# Patient Record
Sex: FEMALE | Race: BLACK OR AFRICAN AMERICAN | Employment: OTHER | URBAN - METROPOLITAN AREA
[De-identification: names, ages, dates, MRNs, and addresses within clinical notes are randomized per-mention and may not be internally consistent; named-entity substitution may affect disease eponyms.]

---

## 2019-10-13 ENCOUNTER — APPOINTMENT (OUTPATIENT)
Dept: CT IMAGING | Facility: HOSPITAL | Age: 64
End: 2019-10-13
Attending: EMERGENCY MEDICINE
Payer: MEDICARE

## 2019-10-13 ENCOUNTER — HOSPITAL ENCOUNTER (EMERGENCY)
Facility: HOSPITAL | Age: 64
Discharge: HOME | End: 2019-10-13
Attending: EMERGENCY MEDICINE
Payer: MEDICARE

## 2019-10-13 VITALS
WEIGHT: 200 LBS | BODY MASS INDEX: 34.15 KG/M2 | OXYGEN SATURATION: 98 % | HEIGHT: 64 IN | HEART RATE: 69 BPM | SYSTOLIC BLOOD PRESSURE: 139 MMHG | TEMPERATURE: 98 F | RESPIRATION RATE: 18 BRPM | DIASTOLIC BLOOD PRESSURE: 90 MMHG

## 2019-10-13 DIAGNOSIS — T14.8XXA MUSCULOSKELETAL STRAIN: Primary | ICD-10-CM

## 2019-10-13 DIAGNOSIS — K59.00 CONSTIPATION, UNSPECIFIED CONSTIPATION TYPE: ICD-10-CM

## 2019-10-13 DIAGNOSIS — N83.8 OVARIAN MASS: ICD-10-CM

## 2019-10-13 PROCEDURE — 74176 CT ABD & PELVIS W/O CONTRAST: CPT | Performed by: EMERGENCY MEDICINE

## 2019-10-13 PROCEDURE — 80048 BASIC METABOLIC PNL TOTAL CA: CPT | Performed by: EMERGENCY MEDICINE

## 2019-10-13 PROCEDURE — 96374 THER/PROPH/DIAG INJ IV PUSH: CPT

## 2019-10-13 PROCEDURE — 81003 URINALYSIS AUTO W/O SCOPE: CPT | Performed by: EMERGENCY MEDICINE

## 2019-10-13 PROCEDURE — 83690 ASSAY OF LIPASE: CPT | Performed by: EMERGENCY MEDICINE

## 2019-10-13 PROCEDURE — 85025 COMPLETE CBC W/AUTO DIFF WBC: CPT | Performed by: EMERGENCY MEDICINE

## 2019-10-13 PROCEDURE — 99284 EMERGENCY DEPT VISIT MOD MDM: CPT

## 2019-10-13 PROCEDURE — 80076 HEPATIC FUNCTION PANEL: CPT | Performed by: EMERGENCY MEDICINE

## 2019-10-13 RX ORDER — KETOROLAC TROMETHAMINE 30 MG/ML
15 INJECTION, SOLUTION INTRAMUSCULAR; INTRAVENOUS ONCE
Status: COMPLETED | OUTPATIENT
Start: 2019-10-13 | End: 2019-10-13

## 2019-10-13 RX ORDER — KETOROLAC TROMETHAMINE 30 MG/ML
30 INJECTION, SOLUTION INTRAMUSCULAR; INTRAVENOUS ONCE
Status: DISCONTINUED | OUTPATIENT
Start: 2019-10-13 | End: 2019-10-13

## 2019-10-13 RX ORDER — NAPROXEN 500 MG/1
500 TABLET ORAL 2 TIMES DAILY PRN
Qty: 20 TABLET | Refills: 0 | Status: SHIPPED | OUTPATIENT
Start: 2019-10-13 | End: 2019-10-20

## 2019-10-13 NOTE — ED PROVIDER NOTES
Patient Seen in: [facility] AND [facility] Emergency Department      History   Patient presents with:  Abdomen/Flank Pain (GI/)    Stated Complaint: pain in right side x1 week    HPI    60-year-old female presents for evaluation of right flank pain.   Patient r sounds: Normal heart sounds. Pulmonary:      Effort: Pulmonary effort is normal. No respiratory distress. Breath sounds: Normal breath sounds. Abdominal:      General: Bowel sounds are normal. There is no distension.       Palpations: Abdomen is so kV was done based on the     patient's size. Use of iterative     reconstruction technique for dose reduction was used.   Dose information is     transmitted to the ACR ([employer]) [address] ([address]) which includ cystic right ovarian mass. A 1.7 cm cyst in     left ovary. Follow-up nonemergent ultrasound recommended for further     evaluation. 3. Small hiatal hernia. 4. Large amount of stool in the colon. 5. Fat containing umbilical hernia.     6. Atheros the patient was found to have Musculoskeletal strain  (primary encounter diagnosis)  Constipation, unspecified constipation type  Ovarian mass    Plan: Well-appearing patient with unremarkable emergency department evaluation.   Discussed CT findings of ovar

## 2019-10-13 NOTE — ED NOTES
Pt to ED c/o right flank pain radiating to RUQ x 1 week. Pt states pain is worse when she is laying down. Denies N/V/D. Denies fever/chills. Pt reports that she has been urinating more frequently in the past week.

## 2019-11-04 ENCOUNTER — APPOINTMENT (OUTPATIENT)
Dept: ULTRASOUND IMAGING | Facility: HOSPITAL | Age: 64
End: 2019-11-04
Attending: EMERGENCY MEDICINE
Payer: MEDICARE

## 2019-11-04 ENCOUNTER — HOSPITAL ENCOUNTER (EMERGENCY)
Facility: HOSPITAL | Age: 64
Discharge: HOME | End: 2019-11-04
Attending: EMERGENCY MEDICINE
Payer: MEDICARE

## 2019-11-04 VITALS
SYSTOLIC BLOOD PRESSURE: 136 MMHG | BODY MASS INDEX: 34.15 KG/M2 | DIASTOLIC BLOOD PRESSURE: 82 MMHG | TEMPERATURE: 98 F | RESPIRATION RATE: 17 BRPM | HEART RATE: 87 BPM | HEIGHT: 64 IN | OXYGEN SATURATION: 98 % | WEIGHT: 200 LBS

## 2019-11-04 DIAGNOSIS — R10.9 ABDOMINAL PAIN, RIGHT LATERAL: ICD-10-CM

## 2019-11-04 DIAGNOSIS — N83.8 OVARIAN MASS, RIGHT: Primary | ICD-10-CM

## 2019-11-04 PROCEDURE — 85025 COMPLETE CBC W/AUTO DIFF WBC: CPT | Performed by: EMERGENCY MEDICINE

## 2019-11-04 PROCEDURE — 99284 EMERGENCY DEPT VISIT MOD MDM: CPT

## 2019-11-04 PROCEDURE — 96374 THER/PROPH/DIAG INJ IV PUSH: CPT

## 2019-11-04 PROCEDURE — 76830 TRANSVAGINAL US NON-OB: CPT | Performed by: EMERGENCY MEDICINE

## 2019-11-04 PROCEDURE — 93975 VASCULAR STUDY: CPT | Performed by: EMERGENCY MEDICINE

## 2019-11-04 PROCEDURE — 80048 BASIC METABOLIC PNL TOTAL CA: CPT | Performed by: EMERGENCY MEDICINE

## 2019-11-04 PROCEDURE — 76856 US EXAM PELVIC COMPLETE: CPT | Performed by: EMERGENCY MEDICINE

## 2019-11-04 PROCEDURE — 81003 URINALYSIS AUTO W/O SCOPE: CPT | Performed by: EMERGENCY MEDICINE

## 2019-11-04 RX ORDER — HYDROCODONE BITARTRATE AND ACETAMINOPHEN 5; 325 MG/1; MG/1
1 TABLET ORAL EVERY 6 HOURS PRN
Qty: 10 TABLET | Refills: 0 | Status: SHIPPED | OUTPATIENT
Start: 2019-11-04

## 2019-11-04 RX ORDER — DOCUSATE SODIUM 100 MG/1
100 CAPSULE, LIQUID FILLED ORAL 2 TIMES DAILY
Qty: 14 CAPSULE | Refills: 0 | Status: SHIPPED | OUTPATIENT
Start: 2019-11-04 | End: 2019-11-11

## 2019-11-04 RX ORDER — MORPHINE SULFATE 4 MG/ML
4 INJECTION, SOLUTION INTRAMUSCULAR; INTRAVENOUS ONCE
Status: COMPLETED | OUTPATIENT
Start: 2019-11-04 | End: 2019-11-04

## 2019-11-04 RX ORDER — HYDROCODONE BITARTRATE AND ACETAMINOPHEN 5; 325 MG/1; MG/1
1 TABLET ORAL EVERY 6 HOURS PRN
Qty: 10 TABLET | Refills: 0 | Status: SHIPPED | OUTPATIENT
Start: 2019-11-04 | End: 2019-11-04

## 2019-11-04 RX ORDER — DOCUSATE SODIUM 100 MG/1
100 CAPSULE, LIQUID FILLED ORAL 2 TIMES DAILY
Qty: 14 CAPSULE | Refills: 0 | Status: SHIPPED | OUTPATIENT
Start: 2019-11-04 | End: 2019-11-07

## 2019-11-04 NOTE — ED NOTES
Pt seen at another hospital and here for the same pain. Has had several imaging done. No source of pain has been found.

## 2019-11-04 NOTE — CM/SW NOTE
Dr. Landy Rausch on call office called to schedule follow up appointment for patient per Dr. Satinder Andersen request.

## 2019-11-04 NOTE — ED PROVIDER NOTES
Patient Seen in: [facility] AND [facility] Emergency Department      History   Patient presents with:  Abdomen/Flank Pain (GI/)    Stated Complaint: Abd pain     HPI    28-year-old female with hypertension with a doctor elsewhere here about 3 weeks ago with si Cardiovascular: Normal rate, regular rhythm and intact distal pulses. Pulmonary/Chest: Effort normal. No respiratory distress. Abdominal: Soft.   No distention or guarding but there is marked pain in the right midabdomen, right lower quadrant and rig size. Use of iterative reconstruction technique for dose reduction was used. Dose information is transmitted to the ACR [facility] [employer] [facility]) [facility] ([address]) which includes the Dose Index Registry.   FINDINGS:  LIVER: Norm Fat containing umbilical hernia. 6. Atherosclerotic vascular calcification without aneurysm. 7. L4-5:  Minimal anterolisthesis. Decompressive laminectomy with posterior and interbody fusion.     Dictated by (CST): Kaylin Davalos MD on 10/13/2019 at 10:16 MDM     Patient improved here. She is feeling very comfortable. She has an appointment with Dr. Preeit Ibarra tomorrow at 2 PM arranged by our  Donny Guerrero here. She will be given some pain medicine at home.   She knows to return with worseni

## 2019-11-04 NOTE — CM/SW NOTE
Office called scheduled with Dr. Rosangela Wang available is for: tomorrow 11/5/19 @ 2:00 PM     Address:   [address] # [facility] [place]      Bring: Insurance Card and list of meds.

## 2019-11-07 ENCOUNTER — OFFICE VISIT (OUTPATIENT)
Dept: OBGYN CLINIC | Facility: CLINIC | Age: 64
End: 2019-11-07
Payer: MEDICARE

## 2019-11-07 VITALS — DIASTOLIC BLOOD PRESSURE: 80 MMHG | WEIGHT: 196.38 LBS | SYSTOLIC BLOOD PRESSURE: 130 MMHG | BODY MASS INDEX: 34 KG/M2

## 2019-11-07 DIAGNOSIS — Z78.0 MENOPAUSE: ICD-10-CM

## 2019-11-07 DIAGNOSIS — N83.201 CYST OF RIGHT OVARY: Primary | ICD-10-CM

## 2019-11-07 DIAGNOSIS — M54.6 ACUTE MIDLINE THORACIC BACK PAIN: ICD-10-CM

## 2019-11-07 DIAGNOSIS — R10.11 RIGHT UPPER QUADRANT ABDOMINAL PAIN: ICD-10-CM

## 2019-11-07 PROCEDURE — 99205 OFFICE O/P NEW HI 60 MIN: CPT | Performed by: OBSTETRICS & GYNECOLOGY

## 2019-11-07 NOTE — PROGRESS NOTES
HPI:   Shun Frazier is a 62year old female who presents for a history of right upper abdominal/back pain which started October 3, 2019 and which continued on and off until the patient was seen in the emergency room and multiple facilities per the patient sh Tobacco Use      Smoking status: Former Smoker        Packs/day: 0.00      Smokeless tobacco: Never Used      Tobacco comment: quit 2014    Alcohol use: Not on file    Drug use: Not on file           REVIEW OF SYSTEMS:   GENERAL: feels well otherwise  SKIN pelvic pain. FINDINGS:             UTERUS:     Prior hysterectomy. The vaginal cuff is unremarkable.      OVARIES AND ADNEXA:                RIGHT:              Within the right ovary, there is a bilobed complex cystic lesion measuring 2.1 x 2.5 x 2 cm cystBilobed bilobed complex cystic lesion measuring 2.5 cm and 2.2 cm normal ovarian flow noted.   MRI was recommended by radiology and this was ordered and the patient was given the number to schedule the MRI which she will schedule soon as she is possible

## 2019-11-08 ENCOUNTER — TELEPHONE (OUTPATIENT)
Dept: OBGYN CLINIC | Facility: CLINIC | Age: 64
End: 2019-11-08

## 2019-11-08 ENCOUNTER — OFFICE VISIT (OUTPATIENT)
Dept: OBGYN CLINIC | Facility: CLINIC | Age: 64
End: 2019-11-08
Payer: MEDICARE

## 2019-11-08 VITALS — WEIGHT: 200 LBS | BODY MASS INDEX: 34 KG/M2 | DIASTOLIC BLOOD PRESSURE: 76 MMHG | SYSTOLIC BLOOD PRESSURE: 118 MMHG

## 2019-11-08 DIAGNOSIS — M54.89 OTHER BACK PAIN, UNSPECIFIED CHRONICITY: Primary | ICD-10-CM

## 2019-11-08 DIAGNOSIS — R10.10 PAIN OF UPPER ABDOMEN: ICD-10-CM

## 2019-11-08 PROBLEM — N83.209 CYST, OVARIAN: Status: ACTIVE | Noted: 2019-11-08

## 2019-11-08 PROBLEM — M54.9 BACK PAIN: Status: ACTIVE | Noted: 2019-11-08

## 2019-11-08 PROCEDURE — 99213 OFFICE O/P EST LOW 20 MIN: CPT | Performed by: OBSTETRICS & GYNECOLOGY

## 2019-11-08 NOTE — PROGRESS NOTES
HPI:   Priya Arellano is a 62year old female who presents for a f/u due to back/abd pain , pt stated she used a back massager yesterday and all her symptoms have resolved. Pt stated she felt so well that she cleaned her whole house this morning.  Pt requested BMI 34.33 kg/m²   Body mass index is 34.33 kg/m².    GENERAL: well developed, well nourished,in no apparent distress  SKIN: no rashes,no suspicious lesions  HEENT: atraumatic, normocephalic  EYES:normal in appearance  NECK: supple,no adenopathy  CHEST: no c

## 2019-11-08 NOTE — TELEPHONE ENCOUNTER
Please call pt in followup next week to see if she needs help making appt for her MRI of pelvic to look at her ovarian cyst.

## 2019-11-09 PROBLEM — N83.201 CYST OF RIGHT OVARY: Status: ACTIVE | Noted: 2019-11-08

## 2019-11-09 PROBLEM — M54.6 ACUTE MIDLINE THORACIC BACK PAIN: Status: ACTIVE | Noted: 2019-11-09

## 2019-11-11 NOTE — TELEPHONE ENCOUNTER
Called pt and informed of MRI of pelvis to be scheduled this week. Pt provided number for Central Scheduling [phone]. Pt had no further questions.

## 2020-01-08 ENCOUNTER — HOSPITAL ENCOUNTER (OUTPATIENT)
Dept: MRI IMAGING | Facility: HOSPITAL | Age: 65
Discharge: HOME | End: 2020-01-08
Attending: OBSTETRICS & GYNECOLOGY
Payer: MEDICARE

## 2020-01-08 DIAGNOSIS — N83.201 CYST OF RIGHT OVARY: ICD-10-CM

## 2020-01-08 DIAGNOSIS — Z78.0 MENOPAUSE: ICD-10-CM

## 2020-01-08 LAB — CREAT BLD-MCNC: 0.9 MG/DL (ref 0.55–1.02)

## 2020-01-08 PROCEDURE — 72197 MRI PELVIS W/O & W/DYE: CPT | Performed by: OBSTETRICS & GYNECOLOGY

## 2020-01-08 PROCEDURE — 82565 ASSAY OF CREATININE: CPT

## 2020-01-08 PROCEDURE — A9575 INJ GADOTERATE MEGLUMI 0.1ML: HCPCS | Performed by: OBSTETRICS & GYNECOLOGY

## 2020-01-15 ENCOUNTER — TELEPHONE (OUTPATIENT)
Dept: OBGYN CLINIC | Facility: CLINIC | Age: 65
End: 2020-01-15

## 2020-01-15 NOTE — TELEPHONE ENCOUNTER
When pt calls back, transfer to Dr. [address] to talk to pt.         ----- Message from Satya Lua MD sent at 1/15/2020  1:41 PM CST -----  CONCLUSION:     Hysterectomy. 2.4 x 5.2 x 3.6 cm lobulated right adnexal cystic lesion.   This lesion dem

## 2020-01-18 ENCOUNTER — TELEPHONE (OUTPATIENT)
Dept: OBGYN CLINIC | Facility: CLINIC | Age: 65
End: 2020-01-18

## 2020-01-18 NOTE — TELEPHONE ENCOUNTER
Pt Name and  verified. Pt requesting results for MRI. Previous phone call looks like [facility] requested pt to be transferred to him when she called back. [facility].

## 2020-01-18 NOTE — TELEPHONE ENCOUNTER
Hi Marroquin, MDPhysicianSigned  9:21 AM                CONCLUSION:     Hysterectomy.     2.4 x 5.2 x 3.6 cm lobulated right adnexal cystic lesion.  This lesion demonstrates a tubular configuration and appears to reflect hydrosalpinx.     1.4 cm sim

## 2020-01-18 NOTE — TELEPHONE ENCOUNTER
Copy of MRI results mailed to pt, along with Dr. Yoselyn Dupont and Dr. Junie Mancilla' contact information. Placed in one [facility] f/u.

## 2020-01-18 NOTE — TELEPHONE ENCOUNTER
CONCLUSION:     Hysterectomy. 2.4 x 5.2 x 3.6 cm lobulated right adnexal cystic lesion. This lesion demonstrates a tubular configuration and appears to reflect hydrosalpinx.      1.4 cm simple appearing left ovarian cyst.  Follow-up pelvic ultrasound i

## 2024-11-16 ENCOUNTER — HOSPITAL ENCOUNTER (EMERGENCY)
Facility: HOSPITAL | Age: 69
Discharge: HOME | End: 2024-11-16
Attending: EMERGENCY MEDICINE
Payer: MEDICARE

## 2024-11-16 VITALS
RESPIRATION RATE: 20 BRPM | BODY MASS INDEX: 29.82 KG/M2 | TEMPERATURE: 98 F | WEIGHT: 190 LBS | DIASTOLIC BLOOD PRESSURE: 88 MMHG | OXYGEN SATURATION: 97 % | SYSTOLIC BLOOD PRESSURE: 138 MMHG | HEART RATE: 68 BPM | HEIGHT: 67 IN

## 2024-11-16 DIAGNOSIS — R35.0 URINARY FREQUENCY: ICD-10-CM

## 2024-11-16 DIAGNOSIS — M54.50 ACUTE MIDLINE LOW BACK PAIN WITHOUT SCIATICA: Primary | ICD-10-CM

## 2024-11-16 LAB
BILIRUB UR QL: NEGATIVE
CLARITY UR: CLEAR
GLUCOSE UR-MCNC: NORMAL MG/DL
HGB UR QL STRIP.AUTO: NEGATIVE
KETONES UR-MCNC: NEGATIVE MG/DL
LEUKOCYTE ESTERASE UR QL STRIP.AUTO: NEGATIVE
NITRITE UR QL STRIP.AUTO: NEGATIVE
PH UR: 5 [PH] (ref 5–8)
PROT UR-MCNC: NEGATIVE MG/DL
SP GR UR STRIP: 1.02 (ref 1–1.03)
UROBILINOGEN UR STRIP-ACNC: NORMAL

## 2024-11-16 PROCEDURE — 99283 EMERGENCY DEPT VISIT LOW MDM: CPT

## 2024-11-16 PROCEDURE — 81003 URINALYSIS AUTO W/O SCOPE: CPT | Performed by: EMERGENCY MEDICINE

## 2024-11-16 NOTE — ED INITIAL ASSESSMENT (HPI)
Patient is here with lower back, lower abdomen pain & frequency with urination yesterday. Denies fevers.

## 2024-11-16 NOTE — DISCHARGE INSTRUCTIONS
Return if increasing pain abdominal pain fever chills dysuria  Tylenol every 4 hours as needed for pain  IcyHot patches to low back  Alternate ice and heat  No bending or lifting  Follow-up with your doctor if not improving in the next 2 to 3 days

## 2024-11-16 NOTE — ED PROVIDER NOTES
Patient Seen in: [facility] Emergency Department      History     Chief Complaint   Patient presents with    Back Pain    Urinary Symptoms     Stated Complaint: Back Pain / Urinary Issues    Subjective:   HPI      The patient is a 69-year-old female with history of hypertension diabetes high cholesterol and previous back surgery who presents with low back pain since yesterday.  She states she woke up with the pain is worse with any movement or bending.  Since last night she has had urinary frequency.  No dysuria or hematuria.  No abdominal pain.  No nausea or vomiting.  No fevers or chills.  The pain does not radiate down her legs.  No incontinence or constipation.  No numbness or weakness to her extremities.  She does not recall any injury or fall.    Objective:     Past Medical History:    Diabetes (HCC)    Essential hypertension    Hyperlipidemia              Past Surgical History:   Procedure Laterality Date    Back surgery                  Social History     Socioeconomic History    Marital status: Single   Tobacco Use    Smoking status: Former     Types: Cigarettes    Smokeless tobacco: Never    Tobacco comments:     quit 2014     [employer] [facility]      Received from [facility]    Social Connections    Received from [facility]    Housing Stability                  Physical Exam     ED Triage Vitals [11/16/24 0709]   /89   Pulse 83   Resp 20   Temp 98.2 °F (36.8 °C)   Temp src Oral   SpO2 98 %   O2 Device None (Room air)       Current Vitals:   Vital Signs  BP: 138/89  Pulse: 83  Resp: 20  Temp: 98.2 °F (36.8 °C)  Temp src: Oral    Oxygen Therapy  SpO2: 98 %  O2 Device: None (Room air)        Physical Exam  Vitals and nursing note reviewed.   Constitutional:       General: She is not in acute distress.     Appearance: Normal appearance. She is well-developed. She is not ill-appearing.   HENT:      Head: Normocephalic and atraumatic.       Mouth/Throat:      Mouth: Mucous membranes are moist.   Eyes:      Conjunctiva/sclera: Conjunctivae normal.      Pupils: Pupils are equal, round, and reactive to light.   Neck:      Vascular: No JVD.   Cardiovascular:      Rate and Rhythm: Normal rate and regular rhythm.      Heart sounds: Normal heart sounds. No murmur heard.  Pulmonary:      Effort: Pulmonary effort is normal.      Breath sounds: Normal breath sounds.   Abdominal:      General: Bowel sounds are normal. There is no distension.      Palpations: Abdomen is soft. There is no mass.      Tenderness: There is no abdominal tenderness. There is no right CVA tenderness, left CVA tenderness, guarding or rebound.   Musculoskeletal:         General: Normal range of motion.      Cervical back: Normal range of motion and neck supple.      Lumbar back: Tenderness present. No bony tenderness. Normal range of motion. Negative right straight leg raise test and negative left straight leg raise test.        Back:    Skin:     General: Skin is warm and dry.      Capillary Refill: Capillary refill takes less than 2 seconds.      Findings: No rash.   Neurological:      General: No focal deficit present.      Mental Status: She is alert and oriented to person, place, and time.      Sensory: No sensory deficit.      Motor: No weakness.      Gait: Gait normal.      Deep Tendon Reflexes: Reflexes are normal and symmetric. Reflexes normal.   Psychiatric:         Judgment: Judgment normal.     Differential diagnosis includes lumbar strain, disc disease, UTI, kidney stone        ED Course     Labs Reviewed   URINALYSIS WITH CULTURE REFLEX                   MDM              Medical Decision Making  Patient with reproducible low back pain on palpation and with movement but steady gait able to move with out assistance  Neurovascularly intact  Urine clear  Vital signs stable prior to discharge  Likely lumbar strain  Patient to follow-up with her doctor and return if symptoms  worsen    Problems Addressed:  Acute midline low back pain without sciatica: acute illness or injury  Urinary frequency: acute illness or injury    Amount and/or Complexity of Data Reviewed  Labs: ordered.    Risk  OTC drugs.        Disposition and Plan     Clinical Impression:  1. Acute midline low back pain without sciatica    2. Urinary frequency         Disposition:  Discharge  11/16/2024  9:56 am    Follow-up:  Your doctor    Schedule an appointment as soon as possible for a visit in 2 day(s)      We recommend that you schedule follow up care with a primary care provider within the next three months to obtain basic health screening including reassessment of your blood pressure.      Medications Prescribed:  Current Discharge Medication List              Supplementary Documentation:

## (undated) NOTE — LETTER
12/7/2019              [address]          Dear [facility] records indicate that the MRI of the pelvis test ordered for you by Benji Caldwell. [facility], [place]  has not been done.   If you have, in fact, already com

## (undated) NOTE — ED AVS SNAPSHOT
Jaycee Munoz   MRN: [id number]    Department:  [facility] Emergency Department   Date of Visit:  10/13/2019           Disclosure     Insurance plans vary and the physician(s) referred by the ER may not be covered by your plan.  Please contact your within the next three months to obtain basic health screening including reassessment of your blood pressure.     IF THERE IS ANY CHANGE OR WORSENING OF YOUR CONDITION, CALL YOUR PRIMARY CARE PHYSICIAN AT ONCE OR RETURN IMMEDIATELY TO THE EMERGENCY DEPARTMEN

## (undated) NOTE — ED AVS SNAPSHOT
Shun Frazier   MRN: [id number]    Department:  [facility] Emergency Department   Date of Visit:  11/4/2019           Disclosure     Insurance plans vary and the physician(s) referred by the ER may not be covered by your plan.  Please contact your within the next three months to obtain basic health screening including reassessment of your blood pressure.     IF THERE IS ANY CHANGE OR WORSENING OF YOUR CONDITION, CALL YOUR PRIMARY CARE PHYSICIAN AT ONCE OR RETURN IMMEDIATELY TO THE EMERGENCY DEPARTMEN